# Patient Record
Sex: FEMALE | Race: WHITE | NOT HISPANIC OR LATINO | Employment: OTHER | URBAN - METROPOLITAN AREA
[De-identification: names, ages, dates, MRNs, and addresses within clinical notes are randomized per-mention and may not be internally consistent; named-entity substitution may affect disease eponyms.]

---

## 2017-02-03 ENCOUNTER — GENERIC CONVERSION - ENCOUNTER (OUTPATIENT)
Dept: OTHER | Facility: OTHER | Age: 82
End: 2017-02-03

## 2018-01-10 NOTE — MISCELLANEOUS
Message  Rec'd a call from Pt's daughter Herman Monteiro  She said that the nursing home is not happy with Nneka's dx and treatment plan  I tried to explain to the pt that she has a heel pressure ulcer and we treat those different than a venous ulcer  She wanted her mother to see Dr Ang Adams for a second opinion so I transferred the call to scheduling  Active Problems    1  Acute low back pain (724 2) (M54 5)   2  Anticoagulated on Coumadin (V58 83,V58 61) (Z51 81,Z79 01)   3  Aortic stenosis (424 1) (I35 0)   4  Atherosclerosis of arteries of extremities (440 20) (I70 209)   5  Atherosclerosis of native artery of left lower extremity with ulceration of other part of lower   leg (440 23,707 9) (I70 248)   6  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   (440 23,707 9) (I70 235)   7  Atrial fibrillation (427 31) (I48 91)   8  Carotid artery stenosis (433 10) (I65 29)   9  Chronic diastolic congestive heart failure (428 32,428 0) (I50 32)   10  Chronic radicular low back pain (724 4,338 29) (M54 16,G89 29)   11  Dementia (294 20) (F03 90)   12  Effusion of knee, unspecified laterality (719 06) (M25 469)   13  Falls (E888 9) (W19 XXXA)   14  Hypertension (401 9) (I10)   15  Low back pain (724 2) (M54 5)   16  Lumbar stenosis (724 02) (M48 06)   17  Open Wound Of The Ankle (891 0)   18  Osteoarthritis of both knees (715 96) (M17 0)   19  Osteoarthritis, localized, knee (715 36) (M17 9)   20  Pain in joint, lower leg (719 46) (M25 569)   21  Peripheral vascular disease (443 9) (I73 9)   22  Pressure Ulcer Of The Left Ankle (707 06)   23  Sick sinus syndrome (427 81) (I49 5)   24  Unstageable Pressure Ulcer (707 25)    Current Meds   1  AmLODIPine Besylate 10 MG Oral Tablet; Take 1 tablet daily; Therapy: (Recorded:[id number]) to Recorded   2  Calcium 500 MG Oral Tablet; Therapy: (Recorded:13Osz4050) to Recorded   3  Colace CAPS;    Therapy: (Recorded:09Dec2015) to Recorded 4  Daily Multi Oral Tablet; TAKE 1 TABLET DAILY; Therapy: (Recorded:04Apr2014) to Recorded   5  Dulcolax 10 MG Rectal Suppository; Therapy: (Recorded:09Dec2015) to Recorded   6  Fleet Enema ENEM; Therapy: (Recorded:09Dec2015) to Recorded   7  Folic Acid 749 MCG Oral Tablet; Therapy: (Recorded:03Apr2015) to Recorded   8  Santosh Revigor Oral Packet; Therapy: (Recorded:13Jan2016) to Recorded   9  Klor-Con M10 10 MEQ Oral Tablet Extended Release; TAKE 1 TABLET DAILY; Therapy: (Recorded:04Apr2014) to Recorded   10  Lidocaine 5 % External Patch; Therapy: (Recorded:13Jan2016) to Recorded   11  Loperamide HCl - 2 MG Oral Capsule; Therapy: (Recorded:09Dec2015) to Recorded   12  Magnesium 250 MG Oral Tablet; TAKE 1 TABLET DAILY; Therapy: (Recorded:04Apr2014) to Recorded   13  Meclizine HCl - 12 5 MG Oral Tablet; TAKE 1 TABLET 3 TIMES DAILY AS NEEDED; Therapy: (Recorded:04Apr2014) to Recorded   14  MiraLax POWD (Polyethylene Glycol 3350); MIX 1 CAPFUL (17GM) IN 8 OUNCES OF    WATER, JUICE, OR TEA AND DRINK DAILY; Therapy: (Recorded:04Apr2014) to Recorded   15  Mycelex 10 MG TROC (Clotrimazole); Therapy: (Recorded:09Dec2015) to Recorded   16  Potassium Chloride Ragini ER 10 MEQ Oral Tablet Extended Release; Therapy: (Recorded:09Dec2015) to Recorded   17  Systane 0 4-0 3 % Ophthalmic Solution; INSTILL 1 DROP IN AFFECTED EYE(S) EVERY    4-6 HOURS; Therapy: (Recorded:04Apr2014) to Recorded   18  TraMADol HCl - 50 MG Oral Tablet Recorded   19  Vitamin B12 100 MCG Oral Tablet; TAKE 1 TABLET DAILY AS DIRECTED; Therapy: (Recorded:04Apr2014) to Recorded   20  Vitamin D 1000 UNIT CAPS; Therapy: (Recorded:03Apr2015) to Recorded   21  Warfarin Sodium 2 5 MG Oral Tablet; Take 1 to 1 1/2 tablets daily by mouth or as directed    by physician  Requested for: [id number]; Last Rx:[id number] Ordered    Allergies    1   No Known Drug Allergies    Signatures   Electronically signed by : Toyin Haq, ; Jan 18 2016 11:04AM EST                       (Author)

## 2018-01-10 NOTE — PROGRESS NOTES
Assessment    1  Osteoarthritis, localized, knee (235 80) (M17 9)     Osteoarthritis the right knee this 80year-old female that remains painful and injection of corticosteroid is indicated for pain we purposes is advised except and administered as outlined above I would welcome the opportunity see her back in 8 weeks' time in a repeat injection that we 4 weeks prior to embarking a repeat injection of viscoelastic supplementation     Plan  Osteoarthritis, localized, knee    · Follow-up visit in 2 months Evaluation and Treatment  Follow-up  Status: Hold For -  Scheduling  Requested for: [id number]    Chief Complaint    1  Knee Pain    History of Present Illness  HPI: 80-year-old female who is a nursing home resident brought to the office by her daughter evaluate persistence of pain in the right knee she is 3 months removed from injections of Orthovisc to the right knee and the daughter notices that mom is become deconditioned she is minimal ambulation potential but she still has a painful right knee      Review of Systems    Constitutional: No fever, no chills, feels well, no tiredness, no recent weight gain or loss  Eyes: No complaints of eyesight problems, no red eyes  ENT: no loss of hearing, no nosebleeds, no sore throat  Cardiovascular: No complaints of chest pain, no palpitations, no leg claudication or lower extremity edema  Respiratory: no compliants of shortness of breath, no wheezing, no cough  Gastrointestinal: no complaints of abdominal pain, no constipation, no nausea or diarrhea, no vomiting, no bloody stools  Genitourinary: no complaints of dysuria, no incontinence  Musculoskeletal: as noted in HPI  Integumentary: no complaints of skin rash or lesion, no itching or dry skin, no skin wounds  Neurological: no complaints of headache, no confusion, no numbness or tingling, no dizziness     Endocrine: No complaints of muscle weakness, no feelings of weakness, no frequent urination, no excessive thirst    Psychiatric: No suicidal thoughts, no anxiety, no feelings of depression  Active Problems    1  Acute low back pain (724 2) (M54 5)   2  Aortic stenosis (424 1) (I35 0)   3  Atherosclerosis of arteries of extremities (440 20) (I70 209)   4  Atherosclerosis of native artery of left lower extremity with ulceration of other part of lower   leg (440 23,707 9) (I70 248)   5  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   (440 23,707 9) (I70 235)   6  Atrial fibrillation (427 31) (I48 91)   7  Carotid artery stenosis (433 10) (I65 29)   8  Chronic diastolic congestive heart failure (428 32,428 0) (I50 32)   9  Chronic radicular low back pain (724 4,338 29) (M54 16,G89 29)   10  Dementia (294 20) (F03 90)   11  Effusion of knee, unspecified laterality (719 06) (M25 469)   12  Falls (E888 9) (W19 XXXA)   13  Hypertension (401 9) (I10)   14  Low back pain (724 2) (M54 5)   15  Lumbar stenosis (724 02) (M48 06)   16  Open Wound Of The Ankle (891 0)   17  Osteoarthritis of both knees (715 96) (M17 0)   18  Osteoarthritis, localized, knee (715 36) (M17 9)   19  Pain in joint, lower leg (719 46) (M25 569)   20  Peripheral vascular disease (443 9) (I73 9)   21  Pressure Ulcer Of The Left Ankle (707 06)   22  Sick sinus syndrome (427 81) (I49 5)   23  Unstageable Pressure Ulcer (707 25)    Past Medical History    · History of Atherosclerosis of native artery of other extremity with ulceration (440 23,707 9)  (I70 25)   · Atrial fibrillation (427 31) (I48 91)   · History of Closed Fracture Of The Proximal Phalanx Of The Left First Toe (826 0)   · History of Closed Fracture Of The Proximal Phalanx Of The Right First Toe (826 0)   · History of gastric ulcer (V12 79) (Z87 19)   · Osteoarthritis, localized, knee (715 36) (M17 9)   · History of Pneumonia (V12 61)    The active problems and past medical history were reviewed and updated today        Surgical History    · History of Cataract Surgery   · History of Gallbladder Surgery   · History of Hip Surgery    The surgical history was reviewed and updated today  Family History    · No pertinent family history    · No pertinent family history    · Family history of No Significant Family History    Social History    · Denied: History of Alcohol Use (History)   · Marital History -    · Never A Smoker   · Occupation: Retired    Current Meds   1  AmLODIPine Besylate 10 MG Oral Tablet; Take 1 tablet daily; Therapy: (Recorded:03Apr2015) to Recorded   2  Calcium 500 MG Oral Tablet; Therapy: (Recorded:03Apr2015) to Recorded   3  Colace CAPS; Therapy: (Recorded:09Dec2015) to Recorded   4  Daily Multi Oral Tablet; TAKE 1 TABLET DAILY; Therapy: (Recorded:04Apr2014) to Recorded   5  Dulcolax 10 MG Rectal Suppository; Therapy: (Recorded:09Dec2015) to Recorded   6  Fleet Enema ENEM; Therapy: (Recorded:09Dec2015) to Recorded   7  Folic Acid 864 MCG Oral Tablet; Therapy: (Recorded:03Apr2015) to Recorded   8  Klor-Con M10 10 MEQ Oral Tablet Extended Release; TAKE 1 TABLET DAILY; Therapy: (Recorded:04Apr2014) to Recorded   9  Loperamide HCl - 2 MG Oral Capsule; Therapy: (Recorded:09Dec2015) to Recorded   10  Magnesium 250 MG Oral Tablet; TAKE 1 TABLET DAILY; Therapy: (Recorded:04Apr2014) to Recorded   11  Meclizine HCl - 12 5 MG Oral Tablet; TAKE 1 TABLET 3 TIMES DAILY AS NEEDED; Therapy: (Recorded:04Apr2014) to Recorded   12  MiraLax POWD; MIX 1 CAPFUL (17GM) IN 8 OUNCES OF WATER, JUICE, OR TEA AND    DRINK DAILY; Therapy: (Recorded:04Apr2014) to Recorded   13  Mycelex 10 MG TROC; Therapy: (Recorded:09Dec2015) to Recorded   14  Potassium Chloride Ragini ER 10 MEQ Oral Tablet Extended Release; Therapy: (Recorded:09Dec2015) to Recorded   15  Systane 0 4-0 3 % Ophthalmic Solution; INSTILL 1 DROP IN AFFECTED EYE(S) EVERY    4-6 HOURS; Therapy: (Recorded:04Apr2014) to Recorded   16   TraMADol HCl - 50 MG Oral Tablet Recorded   17  Vitamin B12 100 MCG Oral Tablet; TAKE 1 TABLET DAILY AS DIRECTED; Therapy: (Recorded:04Apr2014) to Recorded   18  Vitamin D 1000 UNIT CAPS; Therapy: (Recorded:03Apr2015) to Recorded   19  Warfarin Sodium 2 5 MG Oral Tablet; Take 1 to 1 1/2 tablets daily by mouth or as directed    by physician  Requested for: [id number]; Last Rx:[id number] Ordered    Allergies    1  No Known Drug Allergies    Vitals  Signs [Data Includes: Current Encounter]    BP Comments: REFUSED BLOOD PRESSURE  Height Unobtainable: Yes  Weight Unobtainable: Yes    Physical Exam   She sits quite confident wheelchair right thigh devoid of anterior right knee is in varus there is on enlargement tenderness medially and there is no effusion there is crepitation with flexion-extension there is no palpable warmth of her synovium        Procedure    Procedure: Injection of the right knee joint  Indication:  Joint pain and Osteoarthritis  Risk, benefits and alternatives were discussed with the patient  Verbal consent was obtained prior to the procedure  Alcohol was used to prep the area  ethyl chloride spray was used as a topical anesthetic  Using sterile technique, the aspiration/injection needle was then directed from a Anteromedial aspect  A 22-gauge was used to inject 2 mL of 1% Lidocaine, 2 mL of 0 25% Bupivacaine and 2 mL of 6mg/mL betamethasone  A bandage was applied  the patient tolerated the procedure well  Complications: none  Follow-up in the office in 8 week(s)        Future Appointments    Date/Time Provider Specialty Site   06/13/2016 01:00 PM Christiano Ford MD Vascular Surgery [facility] VASCULAR [facility]   01/13/2016 09:00 AM JAMIA Raymond Vascular Surgery [facility]     Signatures   Electronically signed by : CANDE Chauhan ; Jan 12 2016  1:26PM EST                       (Author)

## 2018-01-11 NOTE — CONSULTS
History of Present Illness  Carlos Enrique Cardenas is a pleasant 80-year-old female who presents today in referral from Dr Lola Ni for evaluation of a nonhealing lesion under the eye  It has been present for years  Discussion/Summary    Please see history of present illness  Dr Austin Barcenas did evaluate this patient with me and recommended performing a biopsy  A 3 mm punch biopsy was performed as described above, I will see the patient and her daughter back in 2 weeks for a pathology review and suture removal  At that time we will set her up for frozen section for excision of this suspected skin cancer  All questions were answered at today's visit  The patient was counseled regarding diagnostic results  Immunization Counseling The parent/guardian was counseled on the following vaccine components:  total time of encounter was 30 minutes and 20 minutes was spent counseling        Signatures   Electronically signed by : Rocio Boyd, [facility]; May  6 2016  1:47PM EST                       (Author)    Electronically signed by : CANDE Hinton ; May 17 2016 12:17PM EST

## 2018-01-12 NOTE — RESULT NOTES
Verified Results  (1) PT WITH INR [id number] 08:19AM Daune Nyhan     Test Name Result Flag Reference   INR 2 25 H 0 86-1 16   PT 23 8 seconds H 11 8-14 1     (1) PT WITH INR [id number] 07:50AM Daune Nyhan     Test Name Result Flag Reference   INR 1 82 H 0 86-1 16   PT 20 3 seconds H 11 8-14 1     (1) PT WITH INR [id number] 07:34AM Daune Nyhan     Test Name Result Flag Reference   INR 2 26 H 0 86-1 16   PT 23 9 seconds H 11 8-14 1     (1) PT WITH INR [id number] 09:43AM Daune Nyhan     Test Name Result Flag Reference   INR 2 36 H 0 86-1 16   PT 24 7 seconds H 11 8-14 1     (1) PT WITH INR [id number] 11:23AM Daune Nyhan     Test Name Result Flag Reference   INR 2 88 H 0 86-1 16   PT 28 6 seconds H 11 8-14 1     (1) PT WITH INR [id number] 09:00AM Daandrade Nyhan     Test Name Result Flag Reference   INR 2 98 H 0 86-1 16   PT 29 4 seconds H 11 8-14 1     (1) PT WITH INR [id number] 12:12PM Daandrade Nyhan     Test Name Result Flag Reference   INR 3 20 H 0 86-1 16   PT 31 0 seconds H 11 8-14 1     (1) CBC/ PLT (NO DIFF) [id number] 11:47AM Daandrade Nyhan     Test Name Result Flag Reference   HEMATOCRIT 33 2 % L 34 8-46 1   HEMOGLOBIN 10 8 g/dL L 11 5-15 4   MCHC 32 5 g/dL  31 4-37 4   MCH 31 9 pg  26 8-34 3   MCV 97 9 fL  82 0-98 0   PLATELET COUNT 853 Thousands/uL  149-390   RBC COUNT 3 39 Million/uL L 3 81-5 12   RDW 13 4 %  11 6-15 1   WBC COUNT 5 07 Thousand/uL  4 31-10 16   MPV 9 5 fL  8 9-12 7     (1) COMPREHENSIVE METABOLIC PANEL [id number] 97:04KE Daandrade Nyhan   National Kidney Disease Education Program recommendations are as follows:  GFR calculation is accurate only with a steady state creatinine  Chronic Kidney disease less than 60 ml/min/1 73 sq  meters  Kidney failure less than 15 ml/min/1 73 sq  meters  Test Name Result Flag Reference   GLUCOSE,RANDM 87 mg/dL     If the patient is fasting, the ADA then defines impaired fasting glucose as > 100 mg/dL and diabetes as > or equal to 123 mg/dL  SODIUM 142 mmol/L  136-145   POTASSIUM 4 1 mmol/L  3 5-5 3   CHLORIDE 110 mmol/L H 100-108   CARBON DIOXIDE 26 mmol/L  21-32   ANION GAP (CALC) 6 mmol/L  4-13   BLOOD UREA NITROGEN 23 mg/dL  5-25   CREATININE 0 49 mg/dL L 0 60-1 30   CALCIUM 8 1 mg/dL L 8 3-10 1   BILI, TOTAL 0 31 mg/dL  0 20-1 00   ALK PHOSPHATAS 64 U/L     ALT (SGPT) 16 U/L  12-78   AST(SGOT) 12 U/L  5-45   ALBUMIN 2 9 g/dL L 3 5-5 0   TOTAL PROTEIN 5 8 g/dL L 6 4-8 2   eGFR Non-African American      >60 0 ml/min/1 73sq m

## 2018-01-13 NOTE — PROCEDURES
Procedures by Adriana Grissom MD at 7/18/2016  2:24 PM      Author:  Adriana Grissom MD Service:  Orthopedics Author Type:  Resident    Filed:  7/18/2016  2:29 PM Date of Service:  7/18/2016  2:24 PM Status:  Attested    :  Adriana Grissom MD (Resident)  Cosigner:  James Barrera MD at 7/18/2016  2:42 PM    Attestation signed by James Barrera MD at 7/18/2016  2:42 PM                  Teaching Physician Statement    I discussed with the Resident  I agree with the resident's documented findings and plan of care  Procedure- Orthopedics   Joseph Aguilar 80 y o  female MRN: [id number]  Unit/Bed#: [facility] 708-01    Procedure: right knee injection    After sterile preparation of the skin overlying the knee an 22 gauge needle was inserted via a superior lateral portal   A mixture of 2cc 1% lidocaine, 2cc   5% Marcaine, 2cc Betamethasone was injected into the knee joint without resistance  The needle  was withdrawn and a piece of sterile gauze was placed over the site  Pt tolerated the procedure well and was neurovascularly intact both pre and post procedure      Neris Montero MD                   Received for:Provider  EPIC   Jul 18 2016  2:42PM [facility] Standard Time

## 2018-01-13 NOTE — RESULT NOTES
Message   notify [facility] that I Rxd Bactrim DS BID x 10 days  Order sent to [place]     Verified Results  (1) WOUND CULTURE [id number] 12:00AM Sailaja Ibarra     Test Name Result Flag Reference   CLINICAL REPORT (Report) A    Test:        Wound culture  Specimen Type: Wound  Specimen Date:   5/11/2016   Result Date:    5/13/2016 10:12 AM  Result Status:   Final result  Abnormal:      Yes  Resulting Lab:   [facility] [address] [address]            Tel: [phone]      CULTURE                                       ------------------                                   4+ Growth of Methicillin Resistant Staphylococcus aureus (Abnormal)     *** Please note: This patient requires contact precautions  ***      STAIN                                        ------------------                                   Rare Polys    Rare Gram positive cocci in pairs      SUSCEPTIBILITY                                   ------------------                                                       Methicillin Resistant                       Staphylococcus aureus  METHOD                 JAGDISH  -------------------------------------  ------------------------------  AMOXICILLIN + CLAVULANATE        >4/2 ug/ml    Resistant  AMPICILLIN ($$)             >8 00 ug/ml   Resistant  AMPICILLIN + SULBACTAM ($)       <=8/4 ug/ml   Resistant  CEFAZOLIN ($)              <=8 00 ug/ml   Resistant  CLINDAMYCIN ($)             <=0 50 ug/ml   Resistant [1]  ERYTHROMYCIN ($$$$)           >4 00 ug/ml   Resistant  GENTAMICIN ($$)             <=4 ug/ml    Susceptible  OXACILLIN                >2 00 ug/ml   Resistant  TETRACYCLINE              <=4 ug/ml    Susceptible  TRIMETHOPRIM + SULFAMETHOXAZOLE ($$$)  <=0 5/9 5 ug/ml Susceptible  VANCOMYCIN ($)             2 00 ug/ml    Susceptible         *** [1] The D-zone Test is Positive   This isolate is presumed to be resistant      based on inducible Clindamycin resistance  Clindamycin may still be      effective in some patieints   ***       Plan  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    · Sulfamethoxazole-Trimethoprim 400-80 MG Oral Tablet; TAKE 1 TABLET TWICE  DAILY

## 2018-01-16 NOTE — PROGRESS NOTES
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   (440 23,707 9) (I70 235)  2  Atherosclerosis of arteries of extremities (440 20) (I70 209)  3  Dementia (294 20) (F03 90)  4  Lumbar stenosis (724 02) (M48 06)1      1 Amended By: Yue Kuo; Feb 01 2016 2:53 PM EST    Plan  Aortic stenosis, Atherosclerosis of native artery of right lower extremity with ulceration of  other part of foot    · Alginate instructions given; Status:Complete;   Done: 62KPL8916  Ordered; For:Aortic stenosis, Atherosclerosis of native artery of right lower extremity with   ulceration of other part of foot; Ordered By:Zeus John;   · Decubitus Heel/Elbow Protector; Status:Complete;   Done: 19LSE0945  Perform:Not Applicable; Order Comments:Please provide Prevalon boot for the right   heel; YMS:[id number];KOZJGEM; For:Aortic stenosis, Atherosclerosis of native artery of   right lower extremity with ulceration of other part of foot; Ordered   By:Harsh John;   · Follow-up visit in 3 months Evaluation and Treatment  Follow-up  Status: Hold For -  Scheduling  Requested for: 95IOV9296  Ordered; For: Aortic stenosis, Atherosclerosis of native artery of right lower extremity with   ulceration of other part of foot;  Ordered By: Yue Kuo  Performed:     Due: 06WIJ8709    Discussion/Summary  Discussion Summary:   Paola Wick has successfully healed the left lateral ankle ulceration  She now has a 4 mm very superficial ulceration on the left posterior heel  There is minimal drainage in the foam cup provided for protection of her heel  I have recommended the application of Maxorb silver alginate dressing secured with a single piece of paper tape  Covering this with the foam cup and securing the foot and a Prevalon boot should prevent further deterioration of the right heel  Understands and agrees with treatment plan: The treatment plan was reviewed with the patient/guardian  The patient/guardian understands and agrees with the treatment plan   Counseling Documentation With Imm: The patient, patient's family was counseled regarding  Chief Complaint  Chief Complaint Free Text Note Form: "Recheck my wounds "      History of Present Illness  HPI: Mrs Alexia Huerta is here to recheck a right heel ulcer and 2nd right toe  She also has an ulcer on the left ankle  Minimal drainage on the right heel wound  The right foot is very painful  No recent testing  The daughter feels the nursing home is not following instructions for wound correctly  Review of Systems  Complete Female - Vasc:   Constitutional: No fever or chills, feels well, no tiredness, no recent weight gain or weight loss  Eyes: eyesight problems, no sudden vision loss, no blurred vision and no double vision, but no eye pain, no dryness of the eyes, eyes not red, no purulent discharge from the eyes, no itching of the eyes and wears glasses  ENT: hearing loss, but no earache, no nosebleeds, no sore throat, no nasal discharge and no hoarseness  Cardiovascular: irregular heart rate, no painful veins and no bleeding veins, but no chest pain, no intermittent leg claudication, no palpitations and leg pain with walking  Respiratory: No sob, no wheezing, no cough, no sob with exertion, no orthopnea  Gastrointestinal: No nausea, No vomiting, no diarrhea, no blood in stool  Genitourinary: no dysuria, no Hematuria,no urinary incontinence  Musculoskeletal: no lumbar pain and limb swelling, but no joint swelling, no limb pain, no myalgias and no joint stiffness  Integumentary: no rashes, no itching, no dry skin, no skin lesions, no skin wound and ulcers  Neurological: confusion and difficulty walking, but no headache, no numbness, dementia, no dizziness, no limb weakness, no convulsions and no fainting  Psychiatric: no depression, no mood disorders, no anxiety     Hematologic/Lymphatic: a tendency for easy bleeding and a tendency for easy bruising, but no swollen glands and no swollen glands in the neck  Active Problems   1  Acute low back pain (724 2) (M54 5)  2  Anticoagulated on Coumadin (V58 83,V58 61) (Z51 81,Z79 01)  3  Aortic stenosis (424 1) (I35 0)  4  Atherosclerosis of arteries of extremities (440 20) (I70 209)  5  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   (440 23,707 9) (I70 235)  6  Atrial fibrillation (427 31) (I48 91)  7  Carotid artery stenosis (433 10) (I65 29)  8  Chronic diastolic congestive heart failure (428 32,428 0) (I50 32)  9  Chronic radicular low back pain (724 4,338 29) (M54 16,G89 29)  10  Dementia (294 20) (F03 90)  11  Effusion of knee, unspecified laterality (719 06) (M25 469)  12  Falls (E888 9) (W19 XXXA)  13  Hypertension (401 9) (I10)  14  Low back pain (724 2) (M54 5)  15  Lumbar stenosis (724 02) (M48 06)  16  Open Wound Of The Ankle (891 0)  17  Osteoarthritis of both knees (715 96) (M17 0)  18  Osteoarthritis, localized, knee (715 36) (M17 9)  19  Pain in joint, lower leg (719 46) (M25 569)  20  Peripheral vascular disease (443 9) (I73 9)  21  Pressure Ulcer Of The Left Ankle (707 06)  22  Sick sinus syndrome (427 81) (I49 5)  23  Unstageable Pressure Ulcer (707 25)    Past Medical History   1  History of Atherosclerosis of native artery of left lower extremity with ulceration of other   part of lower leg (440 23,707 9) (I70 248)  2  History of Atherosclerosis of native artery of other extremity with ulceration (440 23,707 9)   (I70 25)  3  Atrial fibrillation (427 31) (I48 91)  4  History of Closed Fracture Of The Proximal Phalanx Of The Left First Toe (826 0)  5  History of Closed Fracture Of The Proximal Phalanx Of The Right First Toe (826 0)  6  History of gastric ulcer (V12 79) (Z87 19)  7  Osteoarthritis, localized, knee (715 36) (M17 9)  8  History of Pneumonia (V12 61)    Surgical History   1  History of Cataract Surgery  2  History of Gallbladder Surgery  3  History of Hip Surgery    Family History   1  No pertinent family history   2  No pertinent family history   3  Family history of No Significant Family History    Social History    · Denied: History of Alcohol Use (History)   · Marital History -    · Never A Smoker   · Occupation: Retired    Current Meds  1  AmLODIPine Besylate 10 MG Oral Tablet; Take 1 tablet daily; Therapy: (Recorded:03Apr2015) to Recorded  2  Calcium 500 MG Oral Tablet; Therapy: (Recorded:03Apr2015) to Recorded  3  Colace CAPS; Therapy: (Recorded:09Dec2015) to Recorded  4  Daily Multi Oral Tablet; TAKE 1 TABLET DAILY; Therapy: (Recorded:04Apr2014) to Recorded  5  Dulcolax 10 MG Rectal Suppository; Therapy: (Recorded:09Dec2015) to Recorded  6  Fleet Enema ENEM; Therapy: (Recorded:09Dec2015) to Recorded  7  Folic Acid 837 MCG Oral Tablet; Therapy: (Recorded:03Apr2015) to Recorded  8  Santosh Revigor Oral Packet; Therapy: (Recorded:13Jan2016) to Recorded  9  Klor-Con M10 10 MEQ Oral Tablet Extended Release; TAKE 1 TABLET DAILY; Therapy: (Recorded:04Apr2014) to Recorded  10  Lidocaine 5 % External Patch; Therapy: (Recorded:13Jan2016) to Recorded  11  Loperamide HCl - 2 MG Oral Capsule; Therapy: (Recorded:09Dec2015) to Recorded  12  Magnesium 250 MG Oral Tablet; TAKE 1 TABLET DAILY; Therapy: (Recorded:04Apr2014) to Recorded  13  Meclizine HCl - 12 5 MG Oral Tablet; TAKE 1 TABLET 3 TIMES DAILY AS NEEDED; Therapy: (Recorded:04Apr2014) to Recorded  14  MiraLax POWD; MIX 1 CAPFUL (17GM) IN 8 OUNCES OF WATER, JUICE, OR TEA AND    DRINK DAILY; Therapy: (Recorded:04Apr2014) to Recorded  15  Mycelex 10 MG TROC; Therapy: (Recorded:09Dec2015) to Recorded  16  Potassium Chloride Ragini ER 10 MEQ Oral Tablet Extended Release; Therapy: (Recorded:09Dec2015) to Recorded  17  Systane 0 4-0 3 % Ophthalmic Solution; INSTILL 1 DROP IN AFFECTED EYE(S) EVERY    4-6 HOURS;     Therapy: (Recorded:04Apr2014) to Recorded  18  TraMADol HCl - 50 MG Oral Tablet Recorded  19  Vitamin B12 100 MCG Oral Tablet; TAKE 1 TABLET DAILY AS DIRECTED; Therapy: (Recorded:04Apr2014) to Recorded  20  Vitamin D 1000 UNIT CAPS; Therapy: (Recorded:03Apr2015) to Recorded  21  Warfarin Sodium 2 5 MG Oral Tablet; Take 1 to 1 1/2 tablets daily by mouth or as directed    by physician  Requested for: 54ILG8940; Last Rx:[id number] Ordered    Allergies   1  No Known Drug Allergies    Vitals  Vital Signs [Data Includes: Current Encounter]    Recorded: 90IEV8354 02:05PM   Heart Rate 68, R Radial   Pulse Quality Normal, R Radial   Respiration 12   Respiration Quality Normal   Height Unobtainable Yes   Weight Unobtainable Yes     Future Appointments    Date/Time Provider Specialty Site   03/17/2016 01:00 PM CANDE Sung  Orthopedic Surgery [facility]   04/14/2016 01:00 PM CANDE Sung   Orthopedic Surgery [facility]   07/14/2016 10:15 AM JAMIA Lundberg Vascular Surgery [facility]     Signatures   Electronically signed by : Carin Justin MD; Feb 1 2016  2:50PM EST                       (Author)    Electronically signed by : Carin Justin MD; Feb 1 2016  2:54PM EST                       (Author)

## 2018-01-18 NOTE — MISCELLANEOUS
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

## 2018-01-23 NOTE — CONSULTS
Assessment    1  Skin lesion (709 9) (L98 9)    Discussion/Summary  Discussion Summary:   Please see history of present illness  Dr Shawnee Ulrich did evaluate this patient with me and recommended performing a biopsy  A 3 mm punch biopsy was performed as described above, I will see the patient and her daughter back in 2 weeks for a pathology review and suture removal  At that time we will set her up for frozen section for excision of this suspected skin cancer  All questions were answered at today's visit  Counseling Documentation With Imm: The patient was counseled regarding diagnostic results  Immunization Counseling The parent/guardian was counseled on the following vaccine components:  total time of encounter was 30 minutes and 20 minutes was spent counseling  History of Present Illness  HPI: Javy Oneil is a pleasant 24-year-old female who presents today in referral from Dr Misty Retana for evaluation of a nonhealing lesion under the eye  It has been present for years  Review of Systems  Complete-Female:   Constitutional: no fever and no chills  Eyes: no eyesight problems  ENT: no hearing loss  Cardiovascular: no chest pain  Respiratory: no shortness of breath  Gastrointestinal: no abdominal pain, no nausea, no constipation and no diarrhea  Integumentary: skin lesion, but no itching and no skin wound  Psychiatric: Dementia, but no depression  Hematologic/Lymphatic: no tendency for easy bleeding and no tendency for easy bruising  ROS Reviewed:   ROS reviewed  Active Problems    1  Acute low back pain (724 2) (M54 5)   2  Anticoagulated on Coumadin (V58 83,V58 61) (Z51 81,Z79 01)   3  Aortic stenosis (747 22) (Q25 3)   4  Atherosclerosis of arteries of extremities (440 20) (I70 209)   5  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   (440 23,707 9) (I70 235)   6  Atrial fibrillation (427 31) (I48 91)   7   Carotid artery stenosis (433 10) (I65 29)   8  Chronic diastolic congestive heart failure (428 32,428 0) (I50 32)   9  Chronic radicular low back pain (724 4,338 29) (M54 16,G89 29)   10  Dementia (294 20) (F03 90)   11  Effusion of knee, unspecified laterality (719 06) (M25 469)   12  Falls (E888 9) (W19 XXXA)   13  Hypertension (401 9) (I10)   14  Low back pain (724 2) (M54 5)   15  Lumbar stenosis (724 02) (M48 06)   16  Open Wound Of The Ankle (891 0)   17  Osteoarthritis of both knees (715 96) (M17 0)   18  Osteoarthritis, localized, knee (715 36) (M17 9)   19  Pain in joint, lower leg (719 46) (M25 569)   20  Peripheral vascular disease (443 9) (I73 9)   21  Sick sinus syndrome (427 81) (I49 5)   22  Unstageable Pressure Ulcer (707 25)    Past Medical History    1  History of Atherosclerosis of native artery of left lower extremity with ulceration of other   part of lower leg (440 23,707 9) (I70 248)   2  History of Atherosclerosis of native artery of other extremity with ulceration (440 23,707 9)   (I70 25)   3  Atrial fibrillation (427 31) (I48 91)   4  History of Closed Fracture Of The Proximal Phalanx Of The Left First Toe (826 0)   5  History of Closed Fracture Of The Proximal Phalanx Of The Right First Toe (826 0)   6  History of gastric ulcer (V12 79) (Z87 19)   7  Osteoarthritis, localized, knee (715 36) (M17 9)   8  History of Pneumonia (V12 61)   9  History of Pressure Ulcer Of The Left Ankle (707 06)  Active Problems And Past Medical History Reviewed: The active problems and past medical history were reviewed and updated today  Surgical History    1  History of Cataract Surgery   2  History of Gallbladder Surgery   3  History of Hip Surgery  Surgical History Reviewed: The surgical history was reviewed and updated today  Family History  Mother    1  No pertinent family history  Father    2  No pertinent family history  Family History    3  Family history of No Significant Family History  Family History Reviewed:    The family history was reviewed and updated today  Social History    · Denied: History of Alcohol Use (History)   · Marital History -    · Never A Smoker   · Occupation: Retired  Social History Reviewed: The social history was reviewed and updated today  The social history was reviewed and is unchanged  Current Meds   1  AmLODIPine Besylate 10 MG Oral Tablet; Take 1 tablet daily; Therapy: (Recorded:03Apr2015) to Recorded   2  Calcium 600 MG Oral Tablet; Therapy: (Darl Heck) to Recorded   3  Clotrimazole 10 MG Mouth/Throat Kristine; Therapy: (Darl Heck) to Recorded   4  Colace CAPS; Therapy: (Darl Heck) to Recorded   5  Daily Multi Oral Tablet; TAKE 1 TABLET DAILY; Therapy: (Recorded:04Apr2014) to Recorded   6  Dulcolax 10 MG Rectal Suppository; Therapy: (Recorded:09Dec2015) to Recorded   7  Fleet Enema ENEM; Therapy: (Recorded:09Dec2015) to Recorded   8  Folic Acid 868 MCG Oral Tablet; Therapy: (Recorded:03Apr2015) to Recorded   9  Santosh Revigor Oral Packet; Therapy: (Recorded:13Jan2016) to Recorded   10  Klor-Con M10 10 MEQ Oral Tablet Extended Release; TAKE 1 TABLET DAILY; Therapy: (Recorded:04Apr2014) to Recorded   11  Lidocaine 5 % External Patch; Therapy: (Recorded:13Jan2016) to Recorded   12  Loperamide HCl - 2 MG Oral Capsule; Therapy: (Recorded:09Dec2015) to Recorded   13  Magnesium 500 MG Oral Tablet; Therapy: (Darl Heck) to Recorded   14  Meclizine HCl - 12 5 MG Oral Tablet; TAKE 1 TABLET 3 TIMES DAILY AS NEEDED; Therapy: (Recorded:04Apr2014) to Recorded   15  MiraLax POWD; MIX 1 CAPFUL (17GM) IN 8 OUNCES OF WATER, JUICE, OR TEA AND    DRINK DAILY; Therapy: (Recorded:04Apr2014) to Recorded   16  Potassium Chloride Ragini ER 10 MEQ Oral Tablet Extended Release; Therapy: (Recorded:09Dec2015) to Recorded   17  Systane 0 4-0 3 % Ophthalmic Solution; INSTILL 1 DROP IN AFFECTED EYE(S) EVERY    4-6 HOURS;     Therapy: (Recorded:38Ufs4613) to Recorded   18  TraMADol HCl - 50 MG Oral Tablet Recorded   19  TraMADol HCl - 50 MG Oral Tablet; TAKE 1 TABLET EVERY 4 TO 6 HOURS AS NEEDED    FOR PAIN;    Therapy: 21HCU6044 to (Evaluate:52Bmr9144); Last Rx:[id number] Ordered   20  Tums CHEW;    Therapy: (Lucretia Sensing) to Recorded   21  Tylenol 500 MG CAPS; Therapy: (Lucretia Sensing) to Recorded   22  Vitamin B12 100 MCG Oral Tablet; TAKE 1 TABLET DAILY AS DIRECTED; Therapy: (Recorded:18Rrl2185) to Recorded   23  Vitamin D 1000 UNIT CAPS; Therapy: (Recorded:18Zxj5092) to Recorded   24  Warfarin Sodium 2 5 MG Oral Tablet; Take 1 to 1 1/2 tablets daily by mouth or as directed    by physician  Requested for: 46YKN0731; Last Rx:[id number] Ordered   25  Warfarin Sodium 3 MG Oral Tablet; TAKE 1 TABLET DAILY AS DIRECTED; Therapy: (Lucretia Sensing) to Recorded  Medication List Reviewed: The medication list was reviewed and updated today  Allergies    1  No Known Drug Allergies    Physical Exam  General Complete Exam (Brief) [facility]:   Constitutional   General appearance: No acute distress, well appearing and well nourished  Eyes   Conjunctiva and lids: No swelling, erythema or discharge  Ears, Nose, Mouth, and Throat   External inspection of ears and nose: Normal     Pulmonary   Respiratory effort: No increased work of breathing or signs of respiratory distress  Cardiovascular   Palpation of heart: Normal PMI, no thrills  Skin   Skin and subcutaneous tissue: Normal without rashes or lesions  6 mm crusty scaly lesion of the R lower eyelid  Procedure: excision of lesion  Indications for the procedure include the lesion has changed and melanoma in situ suspected  Risks and infection risk were discussed with the patient  Written consent was obtained prior to the procedure  Procedure Note:   Anesthesia: Of lidocaine 1% with epinephrine  a 3 mm punch biopsy of the lesion was taken   The cutaneous layer was closed with 1  Specimen: the excised lesion was place in buffered formalin and sent for pathology  Post-Procedure: Complications: there were no complications  Follow-up in the office in 2 week(s)  Future Appointments    Date/Time Provider Specialty Site   05/11/2016 01:00 PM Jerica Winchester MD Vascular Surgery [facility]   07/28/2016 01:00 PM CANDE Finch   Orthopedic Surgery [facility]   07/14/2016 10:15 AM JAMIA Hawk Vascular Surgery [facility]     Signatures   Electronically signed by : Danyelle Olivier, [facility]; May  6 2016  1:47PM EST                       (Author)    Electronically signed by : CANDE Menezes ; May 10 2016 11:35AM EST